# Patient Record
Sex: MALE | Race: WHITE | NOT HISPANIC OR LATINO | Employment: UNEMPLOYED | URBAN - METROPOLITAN AREA
[De-identification: names, ages, dates, MRNs, and addresses within clinical notes are randomized per-mention and may not be internally consistent; named-entity substitution may affect disease eponyms.]

---

## 2022-02-28 ENCOUNTER — TRANSCRIBE ORDERS (OUTPATIENT)
Dept: PHYSICAL THERAPY | Facility: CLINIC | Age: 47
End: 2022-02-28

## 2022-02-28 DIAGNOSIS — M54.2 NECK PAIN: Primary | ICD-10-CM

## 2022-03-28 ENCOUNTER — TREATMENT (OUTPATIENT)
Dept: PHYSICAL THERAPY | Facility: CLINIC | Age: 47
End: 2022-03-28

## 2022-03-28 DIAGNOSIS — M54.2 NECK PAIN: Primary | ICD-10-CM

## 2022-03-28 PROCEDURE — 97162 PT EVAL MOD COMPLEX 30 MIN: CPT | Performed by: PHYSICAL THERAPIST
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
## 2022-04-11 ENCOUNTER — TREATMENT (OUTPATIENT)
Dept: PHYSICAL THERAPY | Facility: CLINIC | Age: 47
End: 2022-04-11

## 2022-04-11 DIAGNOSIS — M54.2 NECK PAIN: Primary | ICD-10-CM

## 2022-04-11 PROCEDURE — 97140 MANUAL THERAPY 1/> REGIONS: CPT | Performed by: PHYSICAL THERAPIST

## 2022-04-11 PROCEDURE — 97110 THERAPEUTIC EXERCISES: CPT | Performed by: PHYSICAL THERAPIST

## 2022-04-11 NOTE — PROGRESS NOTES
Physical Therapy Daily Progress Note    Patient: Tyler Morelos   : 1975  Diagnosis/ICD-10 Code:  Neck pain [M54.2]  Referring practitioner: MIR Guerrero  Date of Initial Visit: Type: THERAPY  Noted: 3/28/2022  Today's Date: 2022  Patient seen for 2 sessions           Subjective     Tyler Morelos reports: Feeling a little more discomfort today due to the damp weather.    Pain rating (0-10):6    Objective   See Exercise, Manual, and Modality Logs for complete treatment.     Initiated therapeutic exercises and therapeutic activities in attempt to increase spinal range of motion and stability.    Initiated manual therapy to decrease pain, tension and spasm in cervical spine and upper trapezius muscles.    Put together home exercise program below:   Access Code: [email or web address]  URL: [email or web address]  Date: 2022  Prepared by: Vicente Singh    Exercises  Seated Cervical Rotation AROM - 1 x daily - 7 x weekly - 1 sets - 10 reps  Seated Cervical Sidebending AROM - 1 x daily - 7 x weekly - 1 sets - 10 reps  Seated Cervical Extension AROM - 1 x daily - 7 x weekly - 1 sets - 10 reps  Seated Cervical Retraction - 1 x daily - 7 x weekly - 1 sets - 10 reps - 5 sec hold  Seated Scapular Retraction - 1 x daily - 7 x weekly - 1 sets - 10 reps - 5 sec hold  Hooklying Neck Distraction and Traction - 1 x daily - 7 x weekly - 1 sets - 5 reps - 5 sec hold        Assessment/Plan   Seemed to tolerate all interventions well.  Some mild relief at end of treatment session.     Progress strengthening /stabilization /functional activity as tolerated.  Assess response to today's interventions.           Manual Therapy:    23     mins  72550;  Therapeutic Exercise:    15     mins  20160;     Neuromuscular Tomasa:        mins  32310;    Therapeutic Activity:          mins  41742;     Gait Training:           mins  54993;     Ultrasound:          mins  38704;    Electrical Stimulation:         mins  69667  ( );  Dry Needling          mins self-pay    Timed Treatment:   38   mins   Total Treatment:     53   mins    Clive Singh, PT  Physical Therapist

## 2022-04-15 ENCOUNTER — TREATMENT (OUTPATIENT)
Dept: PHYSICAL THERAPY | Facility: CLINIC | Age: 47
End: 2022-04-15

## 2022-04-15 DIAGNOSIS — M54.2 NECK PAIN: Primary | ICD-10-CM

## 2022-04-15 PROCEDURE — 97530 THERAPEUTIC ACTIVITIES: CPT | Performed by: PHYSICAL THERAPIST

## 2022-04-15 PROCEDURE — 97140 MANUAL THERAPY 1/> REGIONS: CPT | Performed by: PHYSICAL THERAPIST

## 2022-04-15 NOTE — PROGRESS NOTES
Physical Therapy Daily Progress Note    Patient: Tyler Morelos   : 1975  Diagnosis/ICD-10 Code:  Neck pain [M54.2]  Referring practitioner: MIR Guerrero  Date of Initial Visit: Type: THERAPY  Noted: 3/28/2022  Today's Date: 4/15/2022  Patient seen for 3 sessions           Subjective     Tyler Morelos reports: Had a rough couple of days with increased neck and arm pain.  He thinks the therapy may have been aggravated by the increased stretching amd movement with therapy.  He reports he is supposed to be scheduled for follow up with specialist after receiving results from CT Scan of neck conducted on 2022.    Pain rating (0-10): 8    Objective   See Exercise, Manual, and Modality Logs for complete treatment.     CT impression scanned into chart.  Significant for severe foraminal narrowing B at C-7.    Held exercises today, as patient reported increased pain today.       Assessment/Plan   Patient reported increased pain after gentle exercises last visit.  Does report relief with manual therapy and moist heat.    Progress strengthening /stabilization /functional activity as tolerated.   Resume gentle exercises next visit as appropriate.           Manual Therapy:    25     mins  54999;  Therapeutic Exercise:         mins  88182;     Neuromuscular Tomasa:        mins  66869;    Therapeutic Activity:    10      mins  49216;     Gait Training:           mins  13786;     Ultrasound:          mins  73983;    Electrical Stimulation:         mins  34569 ( );  Dry Needling          mins self-pay    Timed Treatment:   35   mins   Total Treatment:     50   mins    Clive Singh PT  Physical Therapist

## 2022-04-22 ENCOUNTER — TREATMENT (OUTPATIENT)
Dept: PHYSICAL THERAPY | Facility: CLINIC | Age: 47
End: 2022-04-22

## 2022-04-22 DIAGNOSIS — M54.2 NECK PAIN: Primary | ICD-10-CM

## 2022-04-22 PROCEDURE — 97110 THERAPEUTIC EXERCISES: CPT | Performed by: PHYSICAL THERAPIST

## 2022-04-22 PROCEDURE — 97140 MANUAL THERAPY 1/> REGIONS: CPT | Performed by: PHYSICAL THERAPIST

## 2022-04-22 PROCEDURE — 97530 THERAPEUTIC ACTIVITIES: CPT | Performed by: PHYSICAL THERAPIST

## 2022-04-22 NOTE — PROGRESS NOTES
Physical Therapy Daily Progress Note    Patient: Tyler Morelos   : 1975  Diagnosis/ICD-10 Code:  Neck pain [M54.2]  Referring practitioner: MIR Guerrero  Date of Initial Visit: Type: THERAPY  Noted: 3/28/2022  Today's Date: 2022  Patient seen for 4 sessions           Subjective     Tyler Morelos reports:  Does feel like he is getting some relief of pain in shoulders and neck with therapy. Arms continue to be very painful    Pain rating (0-10): 7    Objective   See Exercise, Manual, and Modality Logs for complete treatment.     Resumed previous  therapeutic exercises      Assessment/Plan   Did tolerate exercises today, but still in a lot of pain and limited with tolerance to  therapeutic exercises     Progress strengthening /stabilization /functional activity as tolerated.            Manual Therapy:    20     mins  99287;  Therapeutic Exercise:    15     mins  22644;     Neuromuscular Tomasa:        mins  94158;    Therapeutic Activity:    10      mins  45071;     Gait Training:           mins  80447;     Ultrasound:          mins  92934;    Electrical Stimulation:         mins  86877 ( );  Dry Needling          mins self-pay    Timed Treatment:   45   mins   Total Treatment:    55   mins    Clive Singh PT  Physical Therapist

## 2022-04-26 ENCOUNTER — TREATMENT (OUTPATIENT)
Dept: PHYSICAL THERAPY | Facility: CLINIC | Age: 47
End: 2022-04-26

## 2022-04-26 DIAGNOSIS — M54.2 NECK PAIN: Primary | ICD-10-CM

## 2022-04-26 PROCEDURE — 97110 THERAPEUTIC EXERCISES: CPT | Performed by: PHYSICAL THERAPIST

## 2022-04-26 PROCEDURE — 97140 MANUAL THERAPY 1/> REGIONS: CPT | Performed by: PHYSICAL THERAPIST

## 2022-04-26 NOTE — PROGRESS NOTES
Physical Therapy Daily Progress Note    Patient: Tyler Morelos   : 1975  Diagnosis/ICD-10 Code:  Neck pain [M54.2]  Referring practitioner: MIR Guerrero  Date of Initial Visit: Type: THERAPY  Noted: 3/28/2022  Today's Date: 2022  Patient seen for 5 sessions           Subjective     Tyler Morelos reports:  He continues to have a lot of arm and neck pain that limits his tolerance to daily activity.   He reports that therapy seems to be helping his neck somewhat, but he pain later returns.    Pain rating (0-10): 7    Objective   See Exercise, Manual, and Modality Logs for complete treatment.     Resumed previous  therapeutic exercises      Assessment/Plan   Did tolerate exercises today, but still in a lot of pain and limited with tolerance to  therapeutic exercises     Progress strengthening /stabilization /functional activity as tolerated.   Patient is scheduled for follow up with specialist for his current issues.         Manual Therapy:    25    mins  36716;  Therapeutic Exercise:    15     mins  15634;     Neuromuscular Tomasa:        mins  17540;    Therapeutic Activity:         mins  91357;     Gait Training:           mins  53038;     Ultrasound:          mins  38786;    Electrical Stimulation:         mins  74462 ( );  Dry Needling          mins self-pay    Timed Treatment:   40   mins   Total Treatment:    50   mins    Clive Singh PT  Physical Therapist

## 2022-04-29 ENCOUNTER — TREATMENT (OUTPATIENT)
Dept: PHYSICAL THERAPY | Facility: CLINIC | Age: 47
End: 2022-04-29

## 2022-04-29 DIAGNOSIS — M54.2 NECK PAIN: Primary | ICD-10-CM

## 2022-04-29 PROCEDURE — 97140 MANUAL THERAPY 1/> REGIONS: CPT | Performed by: PHYSICAL THERAPIST

## 2022-04-29 PROCEDURE — 97110 THERAPEUTIC EXERCISES: CPT | Performed by: PHYSICAL THERAPIST

## 2022-07-02 ENCOUNTER — HOSPITAL ENCOUNTER (EMERGENCY)
Facility: HOSPITAL | Age: 47
Discharge: HOME | End: 2022-07-02
Attending: EMERGENCY MEDICINE | Admitting: EMERGENCY MEDICINE

## 2022-07-02 VITALS
HEART RATE: 93 BPM | HEIGHT: 71 IN | RESPIRATION RATE: 18 BRPM | OXYGEN SATURATION: 96 % | DIASTOLIC BLOOD PRESSURE: 77 MMHG | WEIGHT: 257.28 LBS | BODY MASS INDEX: 36.02 KG/M2 | TEMPERATURE: 97.3 F | SYSTOLIC BLOOD PRESSURE: 155 MMHG

## 2022-07-02 DIAGNOSIS — M25.512 ACUTE PAIN OF LEFT SHOULDER: Primary | ICD-10-CM

## 2022-07-02 PROCEDURE — 25010000002 KETOROLAC TROMETHAMINE PER 15 MG: Performed by: NURSE PRACTITIONER

## 2022-07-02 PROCEDURE — 99283 EMERGENCY DEPT VISIT LOW MDM: CPT

## 2022-07-02 PROCEDURE — 96372 THER/PROPH/DIAG INJ SC/IM: CPT

## 2022-07-02 PROCEDURE — 25010000002 DEXAMETHASONE SODIUM PHOSPHATE 10 MG/ML SOLUTION: Performed by: NURSE PRACTITIONER

## 2022-07-02 PROCEDURE — 25010000002 ORPHENADRINE CITRATE PER 60 MG: Performed by: NURSE PRACTITIONER

## 2022-07-02 RX ORDER — METHYLPREDNISOLONE 4 MG/1
TABLET ORAL
Qty: 21 TABLET | Refills: 0 | Status: SHIPPED | OUTPATIENT
Start: 2022-07-02

## 2022-07-02 RX ORDER — CYCLOBENZAPRINE HCL 10 MG
10 TABLET ORAL 3 TIMES DAILY PRN
Qty: 9 TABLET | Refills: 0 | Status: SHIPPED | OUTPATIENT
Start: 2022-07-02 | End: 2022-07-05

## 2022-07-02 RX ORDER — ORPHENADRINE CITRATE 30 MG/ML
60 INJECTION INTRAMUSCULAR; INTRAVENOUS ONCE
Status: COMPLETED | OUTPATIENT
Start: 2022-07-02 | End: 2022-07-02

## 2022-07-02 RX ORDER — KETOROLAC TROMETHAMINE 10 MG/1
10 TABLET, FILM COATED ORAL EVERY 6 HOURS PRN
Qty: 20 TABLET | Refills: 0 | Status: SHIPPED | OUTPATIENT
Start: 2022-07-02 | End: 2022-07-06

## 2022-07-02 RX ORDER — DEXAMETHASONE SODIUM PHOSPHATE 10 MG/ML
10 INJECTION, SOLUTION INTRAMUSCULAR; INTRAVENOUS ONCE
Status: COMPLETED | OUTPATIENT
Start: 2022-07-02 | End: 2022-07-02

## 2022-07-02 RX ORDER — ORPHENADRINE CITRATE 30 MG/ML
60 INJECTION INTRAMUSCULAR; INTRAVENOUS EVERY 12 HOURS
Status: DISCONTINUED | OUTPATIENT
Start: 2022-07-02 | End: 2022-07-02 | Stop reason: SDUPTHER

## 2022-07-02 RX ORDER — KETOROLAC TROMETHAMINE 30 MG/ML
60 INJECTION, SOLUTION INTRAMUSCULAR; INTRAVENOUS ONCE
Status: COMPLETED | OUTPATIENT
Start: 2022-07-02 | End: 2022-07-02

## 2022-07-02 RX ADMIN — ORPHENADRINE CITRATE 60 MG: 30 INJECTION INTRAMUSCULAR; INTRAVENOUS at 18:08

## 2022-07-02 RX ADMIN — DEXAMETHASONE SODIUM PHOSPHATE 10 MG: 10 INJECTION INTRAMUSCULAR; INTRAVENOUS at 18:05

## 2022-07-02 RX ADMIN — KETOROLAC TROMETHAMINE 60 MG: 30 INJECTION, SOLUTION INTRAMUSCULAR at 18:02
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
## 2022-07-02 NOTE — DISCHARGE INSTRUCTIONS
Rest and apply ice to left shoulder/back of neck area.  Wear sling to help with pain.  Please see your PCP and/or specialist for further evaluation and care.  Fill and take prescribed medications, as directed.

## 2022-07-02 NOTE — ED PROVIDER NOTES
"Subjective   47 y/o  male presents to ER with c/o shoulder pain.  Patient denies any known injury and reports/admits to chronic left shoulder pain.  Patient, who presents with Mother, states that he has a \"specialist appmnt\" later this month and states that pain is worse today.  Patient requests sling.  Onset: Several months, admits chronic left shoulder pain  Location: Left shoulder and neck area  Duration: Several months  Character:  Intense pain with movement  Aggravating/Alleviating Factors:  Neck pain and movement/ holding left arm with right hand  Radiation: left side of neck radiating into top of left shoulder area  Severity: severe            Review of Systems   Constitutional: Negative for activity change, appetite change, chills, diaphoresis, fatigue and fever.   HENT: Negative.    Eyes: Negative.    Respiratory: Negative for cough, chest tightness and shortness of breath.    Cardiovascular: Negative for chest pain and palpitations.   Gastrointestinal: Negative for abdominal pain, diarrhea, nausea and vomiting.   Endocrine: Negative.    Genitourinary: Negative.    Musculoskeletal: Positive for arthralgias, myalgias and neck pain. Negative for gait problem, joint swelling and neck stiffness.   Skin: Negative for rash and wound.   Allergic/Immunologic: Negative.    Neurological: Negative for dizziness, tremors, seizures, syncope, speech difficulty, weakness, light-headedness, numbness and headaches.   Psychiatric/Behavioral: Negative.        Past Medical History:   Diagnosis Date   • Allergic    • Anxiety    • Arthritis    • Depression    • Headache    • Injury of back    • Injury of neck    • Osteomyelitis (HCC)     childhood   • Sinusitis        Allergies   Allergen Reactions   • Sulfa Antibiotics Unknown - Low Severity       No past surgical history on file.    No family history on file.    Social History     Socioeconomic History   • Marital status: Single   Tobacco Use   • Smoking status: " Current Every Day Smoker     Start date: 2017   Substance and Sexual Activity   • Alcohol use: Never           Objective   Physical Exam  Vitals reviewed.   Constitutional:       General: He is in acute distress.      Appearance: He is not ill-appearing or toxic-appearing.   HENT:      Head: Normocephalic and atraumatic.   Cardiovascular:      Rate and Rhythm: Normal rate.      Pulses: Normal pulses.   Pulmonary:      Effort: Pulmonary effort is normal.   Musculoskeletal:      Right shoulder: Normal.      Left shoulder: Tenderness present. No swelling, deformity, effusion, laceration, bony tenderness or crepitus. Decreased range of motion. Normal strength. Normal pulse.        Arms:    Neurological:      Mental Status: He is alert.         Procedures           ED Course      Vitals:    07/02/22 1853   BP:    Pulse:    Resp: 18   Temp: 97.3 °F (36.3 °C)   SpO2:         Medications   ketorolac (TORADOL) injection 60 mg (60 mg Intramuscular Given 7/2/22 1802)   dexamethasone sodium phosphate injection 10 mg (10 mg Intramuscular Given 7/2/22 1805)   orphenadrine (NORFLEX) injection 60 mg (60 mg Intramuscular Given 7/2/22 1808)   Medication administration in ER, as seen above.    Sling ordered and placed prior to d/c and after medication administration.    D/c home with prescriptions of cyclobenzaprine, Diclofenac topical, Ketorolac oral, and Medrol dose pack.  Instructed patient to rest, ice and limit use until can be seen by specialist later this month.  Return to ER for any systemic or worsening symptoms.  Patient and family is agreeable to regimen.                                MDM    Final diagnoses:   Acute pain of left shoulder       ED Disposition  ED Disposition     ED Disposition   Discharge    Condition   Stable    Comment   --             Cora Villalba  [address] [place] [address]  [phone]    Schedule an appointment as soon as possible for a visit in 3 days  As needed, If symptoms  worsen         Medication List      New Prescriptions    cyclobenzaprine 10 MG tablet  Commonly known as: FLEXERIL  Take 1 tablet by mouth 3 (Three) Times a Day As Needed for Muscle Spasms for up to 3 days.     Diclofenac Sodium 1 % gel gel  Commonly known as: Voltaren  Apply 4 g topically to the appropriate area as directed 4 (Four) Times a Day As Needed (muscular pain). To back of neck and left shoulder area     ketorolac 10 MG tablet  Commonly known as: TORADOL  Take 1 tablet by mouth Every 6 (Six) Hours As Needed for Moderate Pain  for up to 4 days.     methylPREDNISolone 4 MG dose pack  Commonly known as: MEDROL  Take as directed on package instructions.           Where to Get Your Medications      You can get these medications from any pharmacy    Bring a paper prescription for each of these medications  · cyclobenzaprine 10 MG tablet  · Diclofenac Sodium 1 % gel gel  · ketorolac 10 MG tablet  · methylPREDNISolone 4 MG dose pack          Veronica Zhang, APRN  07/04/22 1423

## 2022-11-28 ENCOUNTER — DOCUMENTATION (OUTPATIENT)
Dept: PHYSICAL THERAPY | Facility: CLINIC | Age: 47
End: 2022-11-28

## 2022-11-28 DIAGNOSIS — M54.2 NECK PAIN: Primary | ICD-10-CM

## 2022-11-28 NOTE — PROGRESS NOTES
Physical Therapy Discharge Summary    [facility]    [address]   [place] [id number]    Patient:Tyler Morelos  : 1975  Diagnosis/ICD-10 Code: Neck pain [M54.2]  Referring Practitioner: No ref. provider found  Date of Initial Visit: 2022  Today's Date: 2022  Patient was seen for Visit count could not be calculated. Make sure you are using a visit which is associated with an episode. sessions    Visit Diagnoses:    ICD-10-CM ICD-9-CM   1. Neck pain  M54.2 723.1       Pt was not seen in the clinic today for a formal re-assessment for d/c.        Esequiel Rees, PT  Physical Therapist  [place] License #: [id number]